# Patient Record
Sex: MALE | Race: WHITE | NOT HISPANIC OR LATINO | Employment: OTHER | URBAN - METROPOLITAN AREA
[De-identification: names, ages, dates, MRNs, and addresses within clinical notes are randomized per-mention and may not be internally consistent; named-entity substitution may affect disease eponyms.]

---

## 2019-06-04 PROBLEM — E13.9 LADA (LATENT AUTOIMMUNE DIABETES IN ADULTS), MANAGED AS TYPE 1: Status: ACTIVE | Noted: 2018-11-20

## 2019-06-04 PROBLEM — Z87.891 FORMER SMOKER: Status: ACTIVE | Noted: 2019-04-02

## 2019-06-04 PROBLEM — M51.36 DDD (DEGENERATIVE DISC DISEASE), LUMBAR: Status: ACTIVE | Noted: 2018-11-20

## 2019-06-04 PROBLEM — J98.6 DISORDER OF DIAPHRAGM: Status: ACTIVE | Noted: 2019-04-02

## 2019-06-04 PROBLEM — M51.369 DDD (DEGENERATIVE DISC DISEASE), LUMBAR: Status: ACTIVE | Noted: 2018-11-20

## 2019-06-04 PROBLEM — E10.42 DIABETIC POLYNEUROPATHY ASSOCIATED WITH TYPE 1 DIABETES MELLITUS: Status: ACTIVE | Noted: 2018-11-20

## 2019-06-04 PROBLEM — F17.200 CURRENT SMOKER: Status: ACTIVE | Noted: 2019-04-02

## 2019-06-04 PROBLEM — B00.9 HERPES SIMPLEX VIRUS INFECTION: Status: ACTIVE | Noted: 2019-02-26

## 2019-10-03 PROBLEM — E10.9 TYPE 1 DIABETES MELLITUS WITHOUT COMPLICATION: Status: ACTIVE | Noted: 2019-10-03

## 2019-10-03 PROBLEM — H26.9 EARLY CATARACTS, BILATERAL: Status: ACTIVE | Noted: 2019-10-03

## 2019-10-24 PROBLEM — F33.41 RECURRENT MAJOR DEPRESSIVE DISORDER, IN PARTIAL REMISSION: Status: ACTIVE | Noted: 2019-10-24

## 2019-10-24 PROBLEM — M53.86 SCIATICA ASSOCIATED WITH DISORDER OF LUMBAR SPINE: Status: ACTIVE | Noted: 2019-10-24

## 2020-06-23 PROBLEM — Z00.00 HEALTHCARE MAINTENANCE: Status: ACTIVE | Noted: 2020-06-23

## 2020-06-23 PROBLEM — Z87.891 HISTORY OF SMOKING 30 OR MORE PACK YEARS: Status: ACTIVE | Noted: 2018-11-20

## 2020-06-23 PROBLEM — J98.6 DISORDER OF DIAPHRAGM: Status: RESOLVED | Noted: 2019-04-02 | Resolved: 2020-06-23

## 2020-09-15 PROBLEM — D36.9 ADENOMATOUS POLYP: Status: ACTIVE | Noted: 2020-09-15

## 2020-09-15 PROBLEM — Z80.0 FAMILY HISTORY OF COLON CANCER IN FATHER: Status: ACTIVE | Noted: 2020-09-15

## 2020-09-28 PROBLEM — Z00.00 HEALTHCARE MAINTENANCE: Status: RESOLVED | Noted: 2020-06-23 | Resolved: 2020-09-28

## 2020-10-26 PROBLEM — E13.9 LATENT AUTOIMMUNE DIABETES MELLITUS IN ADULTS: Status: ACTIVE | Noted: 2020-10-26

## 2020-10-26 PROBLEM — Z79.4 LONG TERM CURRENT USE OF INSULIN: Status: ACTIVE | Noted: 2017-03-17

## 2020-10-26 PROBLEM — E78.5 HYPERLIPIDEMIA: Status: ACTIVE | Noted: 2020-10-26

## 2020-11-12 PROBLEM — F33.41 RECURRENT MAJOR DEPRESSIVE DISORDER, IN PARTIAL REMISSION: Status: RESOLVED | Noted: 2019-10-24 | Resolved: 2020-11-12

## 2021-05-12 ENCOUNTER — PATIENT MESSAGE (OUTPATIENT)
Dept: RESEARCH | Facility: HOSPITAL | Age: 61
End: 2021-05-12

## 2021-06-11 PROBLEM — M67.479 GANGLION CYST OF FOOT: Status: ACTIVE | Noted: 2021-06-11

## 2021-10-26 PROBLEM — R52 PAIN AGGRAVATED BY WALKING: Status: ACTIVE | Noted: 2021-10-26

## 2022-09-08 PROBLEM — H25.812 COMBINED FORM OF AGE-RELATED CATARACT, LEFT EYE: Status: ACTIVE | Noted: 2022-09-08

## 2024-02-29 ENCOUNTER — PATIENT MESSAGE (OUTPATIENT)
Dept: ADMINISTRATIVE | Facility: HOSPITAL | Age: 64
End: 2024-02-29

## 2024-03-15 ENCOUNTER — PATIENT OUTREACH (OUTPATIENT)
Dept: ADMINISTRATIVE | Facility: HOSPITAL | Age: 64
End: 2024-03-15

## 2024-07-23 PROBLEM — K40.90 HERNIA, INGUINAL, RIGHT: Status: ACTIVE | Noted: 2024-07-23

## 2024-07-23 PROBLEM — L72.0 EIC (EPIDERMAL INCLUSION CYST): Status: ACTIVE | Noted: 2024-07-23

## 2024-11-01 ENCOUNTER — PATIENT MESSAGE (OUTPATIENT)
Dept: ADMINISTRATIVE | Facility: HOSPITAL | Age: 64
End: 2024-11-01

## 2024-11-13 PROBLEM — Z87.891 PERSONAL HISTORY OF TOBACCO USE, PRESENTING HAZARDS TO HEALTH: Status: ACTIVE | Noted: 2024-11-13

## 2025-04-16 ENCOUNTER — PATIENT OUTREACH (OUTPATIENT)
Dept: ADMINISTRATIVE | Facility: HOSPITAL | Age: 65
End: 2025-04-16

## 2025-05-20 ENCOUNTER — PATIENT OUTREACH (OUTPATIENT)
Dept: ADMINISTRATIVE | Facility: HOSPITAL | Age: 65
End: 2025-05-20

## 2025-05-20 DIAGNOSIS — E13.9 LATENT AUTOIMMUNE DIABETES MELLITUS IN ADULTS: Primary | ICD-10-CM

## 2025-05-20 NOTE — PROGRESS NOTES
POCT A1c order place     5-20-25- please address open care gap collect A1c noted on 5-21-25 appt notes    Care everywhere updated